# Patient Record
Sex: FEMALE | Race: OTHER | Employment: OTHER | URBAN - METROPOLITAN AREA
[De-identification: names, ages, dates, MRNs, and addresses within clinical notes are randomized per-mention and may not be internally consistent; named-entity substitution may affect disease eponyms.]

---

## 2019-09-30 ENCOUNTER — HOSPITAL ENCOUNTER (INPATIENT)
Facility: HOSPITAL | Age: 69
LOS: 5 days | Discharge: HOME | DRG: 417 | End: 2019-10-05
Attending: EMERGENCY MEDICINE | Admitting: HOSPITALIST
Payer: MEDICAID

## 2019-09-30 ENCOUNTER — APPOINTMENT (OUTPATIENT)
Dept: ULTRASOUND IMAGING | Facility: HOSPITAL | Age: 69
DRG: 417 | End: 2019-09-30
Attending: EMERGENCY MEDICINE
Payer: MEDICAID

## 2019-09-30 ENCOUNTER — APPOINTMENT (OUTPATIENT)
Dept: CT IMAGING | Facility: HOSPITAL | Age: 69
DRG: 417 | End: 2019-09-30
Attending: EMERGENCY MEDICINE
Payer: MEDICAID

## 2019-09-30 ENCOUNTER — APPOINTMENT (OUTPATIENT)
Dept: MRI IMAGING | Facility: HOSPITAL | Age: 69
DRG: 417 | End: 2019-09-30
Attending: SURGERY
Payer: MEDICAID

## 2019-09-30 DIAGNOSIS — K85.10 ACUTE BILIARY PANCREATITIS, UNSPECIFIED COMPLICATION STATUS: Primary | ICD-10-CM

## 2019-09-30 PROCEDURE — 99223 1ST HOSP IP/OBS HIGH 75: CPT | Performed by: HOSPITALIST

## 2019-09-30 PROCEDURE — 76376 3D RENDER W/INTRP POSTPROCES: CPT | Performed by: SURGERY

## 2019-09-30 PROCEDURE — 76705 ECHO EXAM OF ABDOMEN: CPT | Performed by: EMERGENCY MEDICINE

## 2019-09-30 PROCEDURE — 74181 MRI ABDOMEN W/O CONTRAST: CPT | Performed by: SURGERY

## 2019-09-30 RX ORDER — SODIUM CHLORIDE 0.9 % (FLUSH) 0.9 %
3 SYRINGE (ML) INJECTION AS NEEDED
Status: DISCONTINUED | OUTPATIENT
Start: 2019-09-30 | End: 2019-10-05

## 2019-09-30 RX ORDER — SODIUM CHLORIDE, SODIUM LACTATE, POTASSIUM CHLORIDE, CALCIUM CHLORIDE 600; 310; 30; 20 MG/100ML; MG/100ML; MG/100ML; MG/100ML
INJECTION, SOLUTION INTRAVENOUS ONCE
Status: DISCONTINUED | OUTPATIENT
Start: 2019-09-30 | End: 2019-09-30

## 2019-09-30 RX ORDER — LOSARTAN POTASSIUM AND HYDROCHLOROTHIAZIDE 12.5; 5 MG/1; MG/1
1 TABLET ORAL DAILY
COMMUNITY

## 2019-09-30 RX ORDER — MORPHINE SULFATE 2 MG/ML
2 INJECTION, SOLUTION INTRAMUSCULAR; INTRAVENOUS EVERY 2 HOUR PRN
Status: DISCONTINUED | OUTPATIENT
Start: 2019-09-30 | End: 2019-10-05

## 2019-09-30 RX ORDER — ACETAMINOPHEN 650 MG/1
650 SUPPOSITORY RECTAL EVERY 6 HOURS PRN
Status: DISCONTINUED | OUTPATIENT
Start: 2019-09-30 | End: 2019-10-03

## 2019-09-30 RX ORDER — ONDANSETRON 2 MG/ML
4 INJECTION INTRAMUSCULAR; INTRAVENOUS EVERY 6 HOURS PRN
Status: DISCONTINUED | OUTPATIENT
Start: 2019-09-30 | End: 2019-10-05

## 2019-09-30 RX ORDER — MORPHINE SULFATE 2 MG/ML
1 INJECTION, SOLUTION INTRAMUSCULAR; INTRAVENOUS EVERY 2 HOUR PRN
Status: DISCONTINUED | OUTPATIENT
Start: 2019-09-30 | End: 2019-10-05

## 2019-09-30 RX ORDER — DEXTROSE MONOHYDRATE 25 G/50ML
50 INJECTION, SOLUTION INTRAVENOUS AS NEEDED
Status: DISCONTINUED | OUTPATIENT
Start: 2019-09-30 | End: 2019-10-05

## 2019-09-30 RX ORDER — DEXTROSE, SODIUM CHLORIDE, AND POTASSIUM CHLORIDE 5; .45; .15 G/100ML; G/100ML; G/100ML
INJECTION INTRAVENOUS CONTINUOUS
Status: DISCONTINUED | OUTPATIENT
Start: 2019-09-30 | End: 2019-10-01

## 2019-09-30 RX ORDER — MORPHINE SULFATE 4 MG/ML
4 INJECTION, SOLUTION INTRAMUSCULAR; INTRAVENOUS EVERY 2 HOUR PRN
Status: DISCONTINUED | OUTPATIENT
Start: 2019-09-30 | End: 2019-10-05

## 2019-09-30 RX ORDER — AMLODIPINE BESYLATE 5 MG/1
5 TABLET ORAL DAILY
COMMUNITY

## 2019-09-30 NOTE — H&P
[facility]    PATIENT'S NAME: Eve Anisha   ATTENDING PHYSICIAN: Jackie Abdul MD   PATIENT ACCOUNT#:   [id number]    LOCATION:  [place] [address]  MEDICAL RECORD #:   [id number]       YOB: 1950  ADMISSION DATE: Atraumatic. Oropharynx clear. Dry mucous membranes. Normal hard and soft palate. Eyes:  Anicteric sclerae. Pupils equal, round, reactive to light and accommodation. NECK:  Supple. No lymphadenopathy. Trachea midline.    LUNGS:  Clear to auscultatio

## 2019-09-30 NOTE — ED PROVIDER NOTES
Patient Seen in: [facility] 5sw/se      History   Patient presents with:  Abdomen/Flank Pain (GI/)    Stated Complaint: abd pain    HPI    63-year-old female with history of diabetes and hypertension presents for evaluation of abdominal pain.   P sounds are normal. She exhibits no distension. There is tenderness in the right upper quadrant and epigastric area. There is no rebound and no guarding. Musculoskeletal: Normal range of motion.    Neurological: She is alert and oriented to person, place, Please view results for these tests on the individual orders.    LACTIC ACID 3 HR POST POSITIVE   HEMOGLOBIN A1C   RAINBOW DRAW BLUE   RAINBOW DRAW LAVENDER   RAINBOW DRAW LIGHT GREEN   RAINBOW DRAW GOLD   BLOOD CULTURE   BLOOD CULTURE          Imagin 3.375 g in dextrose 5 % 100 mL ADD-vantage (has no administration in time range)   Normal Saline Flush 0.9 % injection 3 mL (has no administration in time range)   dextrose 5 % and 0.45 % NaCl with KCl 20 mEq infusion (has no administration in time range) discharge summaries, testing, and procedures and reviewed those reports. Complicating Factors: The patient already has does not have any pertinent problems on file. to contribute to the complexity of this ED evaluation.     Oxygen Saturation: 94% on figueroa

## 2019-09-30 NOTE — CONSULTS
[facility]    Report of Consultation    Jody Castro Patient Status:  Inpatient    1950 MRN [id number]   Location [facility] 5SW/SE Attending Melissa Ramos MD   Hosp Day # 0 PCP None Pcp     Date of Admission:   5 % and 0.45 % NaCl with KCl 20 mEq infusion  Intravenous Continuous   morphINE sulfate (PF) 2 MG/ML injection 1 mg 1 mg Intravenous Q2H PRN   Or      morphINE sulfate (PF) 2 MG/ML injection 2 mg 2 mg Intravenous Q2H PRN   Or      morphINE sulfate (PF) 4 M sounds normal; no masses,  no organomegaly  Extremities: extremities normal, atraumatic, no cyanosis or edema  Neurologic: Grossly normal    Results:     Laboratory Data:  Lab Results   Component Value Date    WBC 14.9 (H) 09/30/2019    HGB 15.0 09/30/2019 IVFs and MRCP ordered given suspected gallstone pancreatitis. General Surgery also consulted and MRCP recommended.     Plan:  - Agree with MRCP as above  - Continue on IVF hydration  - CBC and CMP in AM  - Okay for diet from GI standpoint however please ma

## 2019-09-30 NOTE — CONSULTS
[facility]    Report of Consultation    Blake Castro Patient Status:  Inpatient    1950 MRN [id number]   Location [facility] 5SW/SE Attending Blair Escamilla MD   Hosp Day # 0 PCP None Pcp     Date of Admission: Oral, Q15 Min PRN  •  glucose (DEX4) oral liquid 15 g, 15 g, Oral, Q15 Min PRN  •  Insulin Regular Human (NOVOLIN R) 100 UNIT/ML injection 1-11 Units, 1-11 Units, Subcutaneous, 4 times per day    Review of Systems:    HEENT:  sinus pain or sore throat  RES 169 (H) 09/30/2019    BILT 1.8 09/30/2019    TP 7.5 09/30/2019     (H) 09/30/2019     (H) 09/30/2019    LIP 5,952 (H) 09/30/2019       Us Gallbladder (cpt=76705)    Result Date: 9/30/2019  CONCLUSION:  Gallbladder wall thickening with choleli

## 2019-09-30 NOTE — PLAN OF CARE
Problem: Patient Centered Care  Goal: Patient preferences are identified and integrated in the patient's plan of care  Description  Interventions:  - What would you like us to know as we care for you? I live at home with my son.  I have 8 children  - Prov

## 2019-09-30 NOTE — ED NOTES
Orders for admission, patient is aware of plan and ready to go upstairs. Any questions, please call ED ONEAL Parks at extension [phone]. Patient will need 3 hr lactic draw at 1500. Chinese speaking only.

## 2019-10-01 PROCEDURE — 99233 SBSQ HOSP IP/OBS HIGH 50: CPT | Performed by: HOSPITALIST

## 2019-10-01 RX ORDER — SODIUM CHLORIDE 9 MG/ML
INJECTION, SOLUTION INTRAVENOUS CONTINUOUS
Status: DISCONTINUED | OUTPATIENT
Start: 2019-10-01 | End: 2019-10-03

## 2019-10-01 RX ORDER — AMLODIPINE BESYLATE 5 MG/1
5 TABLET ORAL DAILY
Status: DISCONTINUED | OUTPATIENT
Start: 2019-10-01 | End: 2019-10-02

## 2019-10-01 RX ORDER — HYDRALAZINE HYDROCHLORIDE 20 MG/ML
20 INJECTION INTRAMUSCULAR; INTRAVENOUS EVERY 4 HOURS PRN
Status: DISCONTINUED | OUTPATIENT
Start: 2019-10-01 | End: 2019-10-05

## 2019-10-01 RX ORDER — LOSARTAN POTASSIUM 50 MG/1
50 TABLET ORAL DAILY
Status: DISCONTINUED | OUTPATIENT
Start: 2019-10-01 | End: 2019-10-05

## 2019-10-01 RX ORDER — HEPARIN SODIUM 5000 [USP'U]/ML
5000 INJECTION, SOLUTION INTRAVENOUS; SUBCUTANEOUS EVERY 8 HOURS SCHEDULED
Status: DISCONTINUED | OUTPATIENT
Start: 2019-10-01 | End: 2019-10-02

## 2019-10-01 NOTE — PLAN OF CARE
Problem: Patient Centered Care  Goal: Patient preferences are identified and integrated in the patient's plan of care  Description  Interventions:  - What would you like us to know as we care for you? I live at home with my son.  I have 8 children  - Prov pain and request assistance  - Assess pain using appropriate pain scale  - Administer analgesics based on type and severity of pain and evaluate response  - Implement non-pharmacological measures as appropriate and evaluate response  - Consider cultural an

## 2019-10-01 NOTE — PROGRESS NOTES
[facility]    Progress Note    Davion Antolin Matthew Patient Status:  Inpatient    1950 MRN [id number]   Location [facility] 5SW/SE Attending Anahy Joseph MD   Hosp Day # 1 PCP None Pcp       Subjective:   Harish Botello pericholecystic fluid. Sonographic Darrius Splinter sign was not elicited with this exam which could be due to recent administration of pain medication however correlate with clinical history. Common bile duct is also enlarged at 11 mm.   The findings suggest acute

## 2019-10-01 NOTE — PROGRESS NOTES
[place] [facility]    Progress Note    March Diego Castro Patient Status:  Inpatient    1950 MRN [id number]   Location [facility] 5SW/SE Attending Abdullahi Little MD   Hosp Day # 1 PCP None Pcp     Subjective:     Tmax 100.9 yest Prophy: SCDs     Leonarda Alvarez PA-C  10/1/2019  3:49 PM    Will d/w Dr. Symone Woodruff  D/w RN    Meds:     • metoprolol Tartrate  25 mg Oral 2x Daily(Beta Blocker)   • amLODIPine Besylate  5 mg Oral Daily   • Losartan Potassium  50 mg Oral Daily   • piperac Nonspecific mild peripancreatic fat stranding could reflect acute interstitial edematous pancreatitis. No acute peripancreatic fluid collection is appreciated. 4. Hepatomegaly with underlying hepatic steatosis.   5. Nonspecific gastric wall thickening like

## 2019-10-01 NOTE — CM/SW NOTE
BEAU left a voicemail for [employer] [id number], regarding medicaid screening. BEAU/CM to remain available for support and/or discharge planning.      Rayo Mason

## 2019-10-01 NOTE — PROGRESS NOTES
[place] [facility]    Progress Note    March Diego Castro Patient Status:  Inpatient    1950 MRN [id number]   Location [facility] 5SW/SE Attending Abdullahi Little MD   Hosp Day # 1 PCP None Pcp     Subjective:     Tmax 100.9 yest Prophy: Jacek Conti PA-C  10/1/2019  3:49 PM    Will d/w Dr. Esau Chauhan  D/w RN  Patient examined myself as well as with PA. Patient with slowly resolving gallstone pancreatitis. Agree begin sips of clear liquids.   Will wait for lipase and 10/1/2019  5:05 PM        Meds:     • metoprolol Tartrate  25 mg Oral 2x Daily(Beta Blocker)   • amLODIPine Besylate  5 mg Oral Daily   • Losartan Potassium  50 mg Oral Daily   • piperacillin-tazobactam  3.375 g Intravenous Q8H   • Insulin Regular Tatyana interstitial edematous pancreatitis. No acute peripancreatic fluid collection is appreciated. 4. Hepatomegaly with underlying hepatic steatosis.   5. Nonspecific gastric wall thickening likely reflects underdistention, but correlation for gastritis is advi

## 2019-10-01 NOTE — PROGRESS NOTES
[facility]    Progress Note    Hannah Farooq Matthew Patient Status:  Inpatient    1950 MRN [id number]   Location [facility] 5SW/SE Attending Giana Davenport MD   Hosp Day # 1 PCP None Pcp        Subjective:   Patient having 11:45     Approved by (CST): Ally Johnson MD on 9/30/2019 at 11:48          Mri Mrcp W/3d Only (GHX=[id number])    Result Date: 10/1/2019  CONCLUSION:  1. Cholelithiasis with MR findings concerning for acute cholecystitis.   2. There is mild extrahepatic b stranding which is likely patients presenting symptoms consistent with pancreatitis. - Suspect gallstone pancreatitis and possibly acute cholecystitis in setting of gallstones which have resolved. Plan:  - Continue on IVF hydration  - Daily CMP.   No n

## 2019-10-02 ENCOUNTER — APPOINTMENT (OUTPATIENT)
Dept: INTERVENTIONAL RADIOLOGY/VASCULAR | Facility: HOSPITAL | Age: 69
DRG: 417 | End: 2019-10-02
Attending: INTERNAL MEDICINE
Payer: MEDICAID

## 2019-10-02 ENCOUNTER — APPOINTMENT (OUTPATIENT)
Dept: CV DIAGNOSTICS | Facility: HOSPITAL | Age: 69
DRG: 417 | End: 2019-10-02
Attending: HOSPITALIST
Payer: MEDICAID

## 2019-10-02 PROCEDURE — 93306 TTE W/DOPPLER COMPLETE: CPT | Performed by: HOSPITALIST

## 2019-10-02 PROCEDURE — 4A023N7 MEASUREMENT OF CARDIAC SAMPLING AND PRESSURE, LEFT HEART, PERCUTANEOUS APPROACH: ICD-10-PCS | Performed by: INTERNAL MEDICINE

## 2019-10-02 PROCEDURE — B2111ZZ FLUOROSCOPY OF MULTIPLE CORONARY ARTERIES USING LOW OSMOLAR CONTRAST: ICD-10-PCS | Performed by: INTERNAL MEDICINE

## 2019-10-02 PROCEDURE — 99233 SBSQ HOSP IP/OBS HIGH 50: CPT | Performed by: HOSPITALIST

## 2019-10-02 RX ORDER — NITROGLYCERIN 20 MG/100ML
INJECTION INTRAVENOUS
Status: COMPLETED
Start: 2019-10-02 | End: 2019-10-02

## 2019-10-02 RX ORDER — ENOXAPARIN SODIUM 100 MG/ML
1 INJECTION SUBCUTANEOUS EVERY 12 HOURS SCHEDULED
Status: DISCONTINUED | OUTPATIENT
Start: 2019-10-02 | End: 2019-10-02

## 2019-10-02 RX ORDER — ACETAMINOPHEN 325 MG/1
650 TABLET ORAL EVERY 6 HOURS PRN
Status: DISCONTINUED | OUTPATIENT
Start: 2019-10-02 | End: 2019-10-03

## 2019-10-02 RX ORDER — MIDAZOLAM HYDROCHLORIDE 1 MG/ML
INJECTION INTRAMUSCULAR; INTRAVENOUS
Status: COMPLETED
Start: 2019-10-02 | End: 2019-10-02

## 2019-10-02 RX ORDER — HEPARIN SODIUM AND DEXTROSE 10000; 5 [USP'U]/100ML; G/100ML
12 INJECTION INTRAVENOUS ONCE
Status: COMPLETED | OUTPATIENT
Start: 2019-10-02 | End: 2019-10-02

## 2019-10-02 RX ORDER — HEPARIN SODIUM AND DEXTROSE 10000; 5 [USP'U]/100ML; G/100ML
INJECTION INTRAVENOUS CONTINUOUS
Status: DISCONTINUED | OUTPATIENT
Start: 2019-10-02 | End: 2019-10-03

## 2019-10-02 RX ORDER — HEPARIN SODIUM 1000 [USP'U]/ML
60 INJECTION, SOLUTION INTRAVENOUS; SUBCUTANEOUS ONCE
Status: COMPLETED | OUTPATIENT
Start: 2019-10-02 | End: 2019-10-02

## 2019-10-02 RX ORDER — DILTIAZEM HYDROCHLORIDE 5 MG/ML
INJECTION INTRAVENOUS
Status: DISPENSED
Start: 2019-10-02 | End: 2019-10-02

## 2019-10-02 RX ORDER — METOPROLOL TARTRATE 50 MG/1
50 TABLET, FILM COATED ORAL
Status: DISCONTINUED | OUTPATIENT
Start: 2019-10-02 | End: 2019-10-05

## 2019-10-02 RX ORDER — HEPARIN SODIUM 1000 [USP'U]/ML
INJECTION, SOLUTION INTRAVENOUS; SUBCUTANEOUS
Status: COMPLETED
Start: 2019-10-02 | End: 2019-10-02

## 2019-10-02 RX ORDER — SODIUM CHLORIDE 9 MG/ML
INJECTION, SOLUTION INTRAVENOUS
Status: CANCELLED | OUTPATIENT
Start: 2019-10-03 | End: 2019-10-03

## 2019-10-02 RX ORDER — ASPIRIN 81 MG/1
TABLET, CHEWABLE ORAL
Status: COMPLETED
Start: 2019-10-02 | End: 2019-10-02

## 2019-10-02 RX ORDER — VERAPAMIL HYDROCHLORIDE 2.5 MG/ML
INJECTION, SOLUTION INTRAVENOUS
Status: COMPLETED
Start: 2019-10-02 | End: 2019-10-02

## 2019-10-02 RX ORDER — ATORVASTATIN CALCIUM 40 MG/1
80 TABLET, FILM COATED ORAL NIGHTLY
Status: DISCONTINUED | OUTPATIENT
Start: 2019-10-02 | End: 2019-10-05

## 2019-10-02 RX ORDER — DILTIAZEM HYDROCHLORIDE 5 MG/ML
5 INJECTION INTRAVENOUS ONCE
Status: COMPLETED | OUTPATIENT
Start: 2019-10-02 | End: 2019-10-02

## 2019-10-02 RX ORDER — LIDOCAINE HYDROCHLORIDE 20 MG/ML
INJECTION, SOLUTION EPIDURAL; INFILTRATION; INTRACAUDAL; PERINEURAL
Status: COMPLETED
Start: 2019-10-02 | End: 2019-10-02

## 2019-10-02 RX ORDER — CHLORHEXIDINE GLUCONATE 4 G/100ML
30 SOLUTION TOPICAL
Status: CANCELLED | OUTPATIENT
Start: 2019-10-03 | End: 2019-10-03

## 2019-10-02 RX ORDER — POTASSIUM CHLORIDE 20 MEQ/1
40 TABLET, EXTENDED RELEASE ORAL EVERY 4 HOURS
Status: COMPLETED | OUTPATIENT
Start: 2019-10-02 | End: 2019-10-02

## 2019-10-02 RX ORDER — ASPIRIN 81 MG/1
81 TABLET, CHEWABLE ORAL DAILY
Status: DISCONTINUED | OUTPATIENT
Start: 2019-10-02 | End: 2019-10-05

## 2019-10-02 NOTE — PROGRESS NOTES
Inpatient Throughput Communication:     Called inpatient RN  and notified of scheduled procedure [id number]     Verified that appropriate consent is signed: cathi said she would  Appropriate Consent Signed: she will  Access Site Hair Clipped and skin prepp

## 2019-10-02 NOTE — PROGRESS NOTES
Called to bedside after rapid response. Upon arrival to room, patient admits to some left-sided nonradiating chest discomfort. States chest discomfort appears to be gradually improving. Denies significant associated dyspnea. Saturation stable.   Van Cardenas

## 2019-10-02 NOTE — PLAN OF CARE
Patient complained to PCT that she is having palpitations at [address], this writer did assessment and VS, HR was at 140s-160s, complained of chest pain used language line. Rapid Response Team was activated.  Dr. Miriam Barriga ordered for STAT EKG and STAT troponin, o

## 2019-10-02 NOTE — IVS NOTE
Procedure hand off report given to [facility]. Pt's vital signs are stable. Procedural access site is dry and intact with no signs and symptoms of bleeding and hematoma.    TR Band in R wrist with 7 ml of air  R groin with dressing, dry and intact, no bl

## 2019-10-02 NOTE — PROGRESS NOTES
[facility]    Progress Note    Janett Ginaedwina Castro Patient Status:  Inpatient    1950 MRN [id number]   Location [facility] 5SW/SE Attending Evelyn Redding MD   Hosp Day # 2 PCP None Pcp       Subjective:   Zulay Gallardo 10/02/2019       Us Gallbladder (cpt=76705)    Result Date: 9/30/2019  CONCLUSION:  Gallbladder wall thickening with cholelithiasis and pericholecystic fluid.   Sonographic Section Yuri sign was not elicited with this exam which could be due to recent administrat cholecystitis  -plan was for surgery but given episode of afib will get cardiology clearance first so holding off on surgery for now  -npo initially - now CLD  -cont IV abx    Afib RVR  -given IV cardizem, back to NSR now  -cardiology consult for cardiac c

## 2019-10-02 NOTE — OPERATIVE REPORT
Preop diagnosis: nstemi  Post op diagnosis: cad  Procedures: [facility] cors  Findings: 50-60% small OM1 stenosis otherwise ANGELITO 3 flow in all vessels. This is consistent with demand MI with RVR. Rec: Continued beta blocker, statin, asa.  May proceed with cholecy

## 2019-10-02 NOTE — PROGRESS NOTES
[place] [facility]    Progress Note    Hira Castro Patient Status:  Inpatient    1950 MRN [id number]   Location [facility] 3W/SW Attending Debbie Patino MD   [facility] Day # 2 PCP None Pcp       Subjective:   Tod Florian .0 175.0 192.0     No results found for: PT, INR    Recent Labs   Lab 09/30/19  1011 10/01/19  0645 10/02/19  0533   * 277* 197*   BUN 12 6* 8   CREATSERUM 0.78 0.58 0.59   GFRAA 91 110 110   GFRNAA 79 96 95   CA 9.4 8.8 8.9   ALB 3.4 2.7* service. There are no major discrepancies.    Dictated by (CST): Cortes Peter MD on 10/01/2019 at 10:04     Approved by (CST): Cortes Peter MD on 10/01/2019 at 10:13          Ekg 12-lead    Result Date: 10/2/2019  ECG Report  Interpretation  ---------

## 2019-10-02 NOTE — PLAN OF CARE
Problem: Patient Centered Care  Goal: Patient preferences are identified and integrated in the patient's plan of care  Description  Interventions:  - What would you like us to know as we care for you? I live at home with my son.  I have 8 children  - Prov pain and request assistance  - Assess pain using appropriate pain scale  - Administer analgesics based on type and severity of pain and evaluate response  - Implement non-pharmacological measures as appropriate and evaluate response  - Consider cultural an gtt initiated. Surgery on consult. Anticipating lap sera Thursday after cardiac clearance. Vitals stable. Remains afebrile. Iv abx. Denies pain Language line in room for interpretation. Accuchecks q6, insulin coverage.  Encourage patient to use call light

## 2019-10-02 NOTE — CONSULTS
[facility]    PATIENT'S NAME: Jose Genaro   ATTENDING PHYSICIAN: Lisa Pulido MD   CONSULTING PHYSICIAN: Nestor Majano DO   PATIENT ACCOUNT#:   [de-identified]    LOCATION:  [address] [place] [address] #:   [id number]       DATE OF TARYN pancreatitis with acute cholecystitis. The patient was febrile to 101 degrees last night. 4.   Hypertension and diabetes. RECOMMENDATIONS:  Discussed with Surgery service. Hold off on surgery at this time.   We will increase the metoprolol to 50 mg tw

## 2019-10-02 NOTE — PLAN OF CARE
Problem: Patient Centered Care  Goal: Patient preferences are identified and integrated in the patient's plan of care  Description  Interventions:  - What would you like us to know as we care for you? I live at home with my son.  I have 8 children  - Prov pain and request assistance  - Assess pain using appropriate pain scale  - Administer analgesics based on type and severity of pain and evaluate response  - Implement non-pharmacological measures as appropriate and evaluate response  - Consider cultural an tele, cardizem gtt started and patient converted back to SR. Troponin negative. Fever spiked, tylenol given, IV bolus given. cardizem gtt stopped. 2echo ordered and Dr Pio Vega on consult.      Damir Yousif at bedside this AM, updated with plan of care, son ab

## 2019-10-02 NOTE — RESPIRATORY THERAPY NOTE
RESPIRATORY THERAPY RAPID RESPONSE NOTE    RRT called for respiratory distress? no  Breathing pattern: Normal  Patient currently being seen by Respiratory Care? no RT interventions necessary? yes  Oxygen applied/increased? yes  Nebulizer performed?  no  ABG

## 2019-10-03 ENCOUNTER — ANESTHESIA EVENT (OUTPATIENT)
Dept: SURGERY | Facility: HOSPITAL | Age: 69
DRG: 417 | End: 2019-10-03
Payer: MEDICAID

## 2019-10-03 ENCOUNTER — ANESTHESIA (OUTPATIENT)
Dept: SURGERY | Facility: HOSPITAL | Age: 69
DRG: 417 | End: 2019-10-03
Payer: MEDICAID

## 2019-10-03 ENCOUNTER — APPOINTMENT (OUTPATIENT)
Dept: GENERAL RADIOLOGY | Facility: HOSPITAL | Age: 69
DRG: 417 | End: 2019-10-03
Attending: SURGERY
Payer: MEDICAID

## 2019-10-03 PROCEDURE — 74300 X-RAY BILE DUCTS/PANCREAS: CPT | Performed by: SURGERY

## 2019-10-03 PROCEDURE — 99233 SBSQ HOSP IP/OBS HIGH 50: CPT | Performed by: HOSPITALIST

## 2019-10-03 PROCEDURE — BF131ZZ FLUOROSCOPY OF GALLBLADDER AND BILE DUCTS USING LOW OSMOLAR CONTRAST: ICD-10-PCS | Performed by: SURGERY

## 2019-10-03 PROCEDURE — 0DNW4ZZ RELEASE PERITONEUM, PERCUTANEOUS ENDOSCOPIC APPROACH: ICD-10-PCS | Performed by: SURGERY

## 2019-10-03 PROCEDURE — 0FT44ZZ RESECTION OF GALLBLADDER, PERCUTANEOUS ENDOSCOPIC APPROACH: ICD-10-PCS | Performed by: SURGERY

## 2019-10-03 RX ORDER — DEXTROSE MONOHYDRATE 25 G/50ML
50 INJECTION, SOLUTION INTRAVENOUS
Status: DISCONTINUED | OUTPATIENT
Start: 2019-10-03 | End: 2019-10-03 | Stop reason: HOSPADM

## 2019-10-03 RX ORDER — ACETAMINOPHEN 500 MG
1000 TABLET ORAL EVERY 8 HOURS
Status: DISCONTINUED | OUTPATIENT
Start: 2019-10-03 | End: 2019-10-05

## 2019-10-03 RX ORDER — ONDANSETRON 2 MG/ML
INJECTION INTRAMUSCULAR; INTRAVENOUS AS NEEDED
Status: DISCONTINUED | OUTPATIENT
Start: 2019-10-03 | End: 2019-10-03 | Stop reason: SURG

## 2019-10-03 RX ORDER — MORPHINE SULFATE 4 MG/ML
4 INJECTION, SOLUTION INTRAMUSCULAR; INTRAVENOUS EVERY 10 MIN PRN
Status: DISCONTINUED | OUTPATIENT
Start: 2019-10-03 | End: 2019-10-03 | Stop reason: HOSPADM

## 2019-10-03 RX ORDER — ACETAMINOPHEN 10 MG/ML
1000 INJECTION, SOLUTION INTRAVENOUS ONCE
Status: COMPLETED | OUTPATIENT
Start: 2019-10-03 | End: 2019-10-03

## 2019-10-03 RX ORDER — NALOXONE HYDROCHLORIDE 0.4 MG/ML
80 INJECTION, SOLUTION INTRAMUSCULAR; INTRAVENOUS; SUBCUTANEOUS AS NEEDED
Status: DISCONTINUED | OUTPATIENT
Start: 2019-10-03 | End: 2019-10-03 | Stop reason: HOSPADM

## 2019-10-03 RX ORDER — BUPIVACAINE HYDROCHLORIDE AND EPINEPHRINE 2.5; 5 MG/ML; UG/ML
INJECTION, SOLUTION INFILTRATION; PERINEURAL AS NEEDED
Status: DISCONTINUED | OUTPATIENT
Start: 2019-10-03 | End: 2019-10-03 | Stop reason: HOSPADM

## 2019-10-03 RX ORDER — NEOSTIGMINE METHYLSULFATE 0.5 MG/ML
INJECTION INTRAVENOUS AS NEEDED
Status: DISCONTINUED | OUTPATIENT
Start: 2019-10-03 | End: 2019-10-03 | Stop reason: SURG

## 2019-10-03 RX ORDER — SODIUM CHLORIDE, SODIUM LACTATE, POTASSIUM CHLORIDE, CALCIUM CHLORIDE 600; 310; 30; 20 MG/100ML; MG/100ML; MG/100ML; MG/100ML
INJECTION, SOLUTION INTRAVENOUS CONTINUOUS
Status: DISCONTINUED | OUTPATIENT
Start: 2019-10-03 | End: 2019-10-03 | Stop reason: HOSPADM

## 2019-10-03 RX ORDER — PROCHLORPERAZINE EDISYLATE 5 MG/ML
5 INJECTION INTRAMUSCULAR; INTRAVENOUS ONCE AS NEEDED
Status: DISCONTINUED | OUTPATIENT
Start: 2019-10-03 | End: 2019-10-03 | Stop reason: HOSPADM

## 2019-10-03 RX ORDER — INSULIN ASPART 100 [IU]/ML
4 INJECTION, SOLUTION INTRAVENOUS; SUBCUTANEOUS ONCE
Status: COMPLETED | OUTPATIENT
Start: 2019-10-03 | End: 2019-10-03

## 2019-10-03 RX ORDER — ONDANSETRON 2 MG/ML
4 INJECTION INTRAMUSCULAR; INTRAVENOUS ONCE AS NEEDED
Status: DISCONTINUED | OUTPATIENT
Start: 2019-10-03 | End: 2019-10-03 | Stop reason: HOSPADM

## 2019-10-03 RX ORDER — MORPHINE SULFATE 10 MG/ML
6 INJECTION, SOLUTION INTRAMUSCULAR; INTRAVENOUS EVERY 10 MIN PRN
Status: DISCONTINUED | OUTPATIENT
Start: 2019-10-03 | End: 2019-10-03 | Stop reason: HOSPADM

## 2019-10-03 RX ORDER — HEPARIN SODIUM 5000 [USP'U]/ML
5000 INJECTION, SOLUTION INTRAVENOUS; SUBCUTANEOUS EVERY 12 HOURS
Status: DISCONTINUED | OUTPATIENT
Start: 2019-10-04 | End: 2019-10-05

## 2019-10-03 RX ORDER — MORPHINE SULFATE 2 MG/ML
2 INJECTION, SOLUTION INTRAMUSCULAR; INTRAVENOUS EVERY 10 MIN PRN
Status: DISCONTINUED | OUTPATIENT
Start: 2019-10-03 | End: 2019-10-03 | Stop reason: HOSPADM

## 2019-10-03 RX ORDER — GLYCOPYRROLATE 0.2 MG/ML
INJECTION INTRAMUSCULAR; INTRAVENOUS AS NEEDED
Status: DISCONTINUED | OUTPATIENT
Start: 2019-10-03 | End: 2019-10-03 | Stop reason: SURG

## 2019-10-03 RX ORDER — DEXTROSE, SODIUM CHLORIDE, AND POTASSIUM CHLORIDE 5; .45; .15 G/100ML; G/100ML; G/100ML
INJECTION INTRAVENOUS CONTINUOUS
Status: DISCONTINUED | OUTPATIENT
Start: 2019-10-03 | End: 2019-10-04

## 2019-10-03 RX ORDER — HYDROMORPHONE HYDROCHLORIDE 1 MG/ML
0.2 INJECTION, SOLUTION INTRAMUSCULAR; INTRAVENOUS; SUBCUTANEOUS EVERY 5 MIN PRN
Status: DISCONTINUED | OUTPATIENT
Start: 2019-10-03 | End: 2019-10-03 | Stop reason: HOSPADM

## 2019-10-03 RX ORDER — SODIUM CHLORIDE, SODIUM LACTATE, POTASSIUM CHLORIDE, CALCIUM CHLORIDE 600; 310; 30; 20 MG/100ML; MG/100ML; MG/100ML; MG/100ML
INJECTION, SOLUTION INTRAVENOUS CONTINUOUS PRN
Status: DISCONTINUED | OUTPATIENT
Start: 2019-10-03 | End: 2019-10-03 | Stop reason: SURG

## 2019-10-03 RX ORDER — TRAMADOL HYDROCHLORIDE 50 MG/1
50 TABLET ORAL EVERY 6 HOURS PRN
Qty: 20 TABLET | Refills: 0 | Status: SHIPPED | OUTPATIENT
Start: 2019-10-03

## 2019-10-03 RX ORDER — DOCUSATE SODIUM 100 MG/1
100 CAPSULE, LIQUID FILLED ORAL 2 TIMES DAILY
Qty: 14 CAPSULE | Refills: 0 | Status: SHIPPED | OUTPATIENT
Start: 2019-10-03 | End: 2019-10-10

## 2019-10-03 RX ORDER — HYDROMORPHONE HYDROCHLORIDE 1 MG/ML
0.6 INJECTION, SOLUTION INTRAMUSCULAR; INTRAVENOUS; SUBCUTANEOUS EVERY 5 MIN PRN
Status: DISCONTINUED | OUTPATIENT
Start: 2019-10-03 | End: 2019-10-03 | Stop reason: HOSPADM

## 2019-10-03 RX ORDER — HALOPERIDOL 5 MG/ML
0.25 INJECTION INTRAMUSCULAR ONCE AS NEEDED
Status: DISCONTINUED | OUTPATIENT
Start: 2019-10-03 | End: 2019-10-03 | Stop reason: HOSPADM

## 2019-10-03 RX ORDER — HYDROMORPHONE HYDROCHLORIDE 1 MG/ML
0.4 INJECTION, SOLUTION INTRAMUSCULAR; INTRAVENOUS; SUBCUTANEOUS EVERY 5 MIN PRN
Status: DISCONTINUED | OUTPATIENT
Start: 2019-10-03 | End: 2019-10-03 | Stop reason: HOSPADM

## 2019-10-03 RX ORDER — ROCURONIUM BROMIDE 10 MG/ML
INJECTION, SOLUTION INTRAVENOUS AS NEEDED
Status: DISCONTINUED | OUTPATIENT
Start: 2019-10-03 | End: 2019-10-03 | Stop reason: SURG

## 2019-10-03 RX ORDER — MIDAZOLAM HYDROCHLORIDE 1 MG/ML
INJECTION INTRAMUSCULAR; INTRAVENOUS AS NEEDED
Status: DISCONTINUED | OUTPATIENT
Start: 2019-10-03 | End: 2019-10-03 | Stop reason: SURG

## 2019-10-03 RX ORDER — FAMOTIDINE 10 MG/ML
20 INJECTION, SOLUTION INTRAVENOUS 2 TIMES DAILY
Status: DISCONTINUED | OUTPATIENT
Start: 2019-10-03 | End: 2019-10-05

## 2019-10-03 RX ADMIN — SODIUM CHLORIDE, SODIUM LACTATE, POTASSIUM CHLORIDE, CALCIUM CHLORIDE: 600; 310; 30; 20 INJECTION, SOLUTION INTRAVENOUS at 14:01:00

## 2019-10-03 RX ADMIN — ROCURONIUM BROMIDE 10 MG: 10 INJECTION, SOLUTION INTRAVENOUS at 15:29:00

## 2019-10-03 RX ADMIN — ROCURONIUM BROMIDE 10 MG: 10 INJECTION, SOLUTION INTRAVENOUS at 14:57:00

## 2019-10-03 RX ADMIN — ROCURONIUM BROMIDE 40 MG: 10 INJECTION, SOLUTION INTRAVENOUS at 14:36:00

## 2019-10-03 RX ADMIN — GLYCOPYRROLATE 0.6 MG: 0.2 INJECTION INTRAMUSCULAR; INTRAVENOUS at 16:06:00

## 2019-10-03 RX ADMIN — MIDAZOLAM HYDROCHLORIDE 2 MG: 1 INJECTION INTRAMUSCULAR; INTRAVENOUS at 14:28:00

## 2019-10-03 RX ADMIN — NEOSTIGMINE METHYLSULFATE 3 MG: 0.5 INJECTION INTRAVENOUS at 16:06:00

## 2019-10-03 RX ADMIN — ONDANSETRON 4 MG: 2 INJECTION INTRAMUSCULAR; INTRAVENOUS at 16:06:00

## 2019-10-03 RX ADMIN — ACETAMINOPHEN 1000 MG: 10 INJECTION, SOLUTION INTRAVENOUS at 16:10:00

## 2019-10-03 NOTE — ANESTHESIA PROCEDURE NOTES
Airway  Date/Time: 10/3/2019 2:40 PM  Urgency: elective    Airway not difficult    General Information and Staff    Patient location during procedure: OR  Anesthesiologist: Moises Coppola MD  Performed: anesthesiologist     Indications and Patient Condition

## 2019-10-03 NOTE — OR PREOP
Patient c/o of slight chest pain prior to be taken into surgery. OR RN, CRNA and Dr. Nadya Massey around nursing station and entered room immediately. Noa Aguirre RN in room translating.  Per patient the pain was gone quickly and not as severe as it was the last time

## 2019-10-03 NOTE — ANESTHESIA PROCEDURE NOTES
Peripheral IV  Date/Time: 10/3/2019 2:30 PM  Inserted by: Chen Blakely MD    Placement  Needle size: 18 G  Laterality: right  Location: wrist  Local anesthetic: none  Site prep: alcohol  Technique: anatomical landmarks  Attempts: 1

## 2019-10-03 NOTE — ANESTHESIA PREPROCEDURE EVALUATION
Anesthesia PreOp Note    HPI:     Mario Hernandez is a 81year old female who presents for preoperative consultation requested by: Muriel Olmos MD    Date of Surgery: 9/30/2019 - 10/3/2019    Procedure(s):  LAPAROSCOPIC CHOLECYSTECTOMY  Indica [MAR Hold] aspirin chewable tab 81 mg 81 mg Oral Daily Nestor Gonzalez DO 81 mg at 10/03/19 0917    heparin (PORCINE) drip [id number]/250mL infusion CONTINUOUS 200-3,000 Units/hr Intravenous Continuous Nestor Gonzalez DO Stopped at 10/02/19 1700    [facility] Epic:  traMADol HCl 50 MG Oral Tab Take 1 tablet (50 mg total) by mouth every 6 (six) hours as needed for Pain. No driving and no alcohol while taking this medication.  Disp: 20 tablet Rfl: 0   docusate sodium (COLACE) 100 MG Oral Cap Take 1 capsule (100 mg HGB 12.4 10/03/2019    HCT 35.8 10/03/2019    MCV 93.7 10/03/2019    MCH 32.5 10/03/2019    MCHC 34.6 10/03/2019    RDW 11.6 10/03/2019    .0 10/03/2019     Lab Results   Component Value Date     10/03/2019    K 4.3 10/03/2019     10/03/ abnormality  STAT ECG performed NSR WNL,D/W cardiologist : proceed with surgery  Informed Consent Plan and Risks Discussed With:  Patient and spouse  Discussed plan with:  CRNA, attending and surgeon  Provider Attestation (if preop done by other):   With RN

## 2019-10-03 NOTE — OPERATIVE REPORT
[facility] OPERATING ROOM OPERATIVE REPORT:     PATIENT NAME: Junaid Oneil  : 1950   MRN: [id number]  SITE:  [facility]:   10/3/2019     PREOPERATIVE DIAGNOSIS: Acute on Chronic Cholecystitis, With galls the operating room and placed in the supine position. General anesthetic was administered via endotracheal tube by anesthesia. . The patient's stomach was decompressed With an orogastric tube and the bladder was decompressed with Willard catheter.   The abdo hemoclips were placed around the cystic duct with care to avoid tenting the cystic duct common duct junction. In a similar fashion, the cystic artery was then clipped with 2 hemoclips proximally and one distally and the base of the gallbladder.   The cystic

## 2019-10-03 NOTE — ANESTHESIA POSTPROCEDURE EVALUATION
Patient: Matt Lr    Procedure Summary     Date:  10/03/19 Room / Location:  [facility] OR 05 / [facility] OR    Anesthesia Start:  2966 Anesthesia Stop:      Procedure:  LAPAROSCOPIC CHOLECYSTECTOMY (N/A ) Diagnosis:  (gallstone pancreatitis)

## 2019-10-03 NOTE — PROGRESS NOTES
Patient seen in follow up. Patient denies any chest pain or sob.      10/03/19  1015   BP: 126/52   Pulse: 56   Resp:    Temp:        Intake/Output Summary (Last 24 hours) at 10/3/2019 1056  Last data filed at 10/3/2019 0500  Gross per 24 hour   Intak glucose (DEX4) oral liquid 15 g 15 g Oral Q15 Min PRN   acetaminophen (TYLENOL) 650 MG rectal suppository 650 mg 650 mg Rectal Q6H PRN       Medications Prior to Admission:  amLODIPine Besylate 5 MG Oral Tab Take 5 mg by mouth daily.    metoprolol Tartrate

## 2019-10-03 NOTE — BRIEF OP NOTE
Pre-Operative Diagnosis: gallstone pancreatitis     Post-Operative Diagnosis: The same     Procedure Performed:   Procedure(s):  laparoscopic cholecystectomy with cholangiogam     Surgeon(s) and Role:     Zoya Estrada MD - Primary    Assistant(s):

## 2019-10-03 NOTE — PROGRESS NOTES
[place] [facility] - [facility]    Progress Note    Mary Castro Patient Status:  Inpatient    1950 MRN [id number]   Location [facility] 5SW/SE Attending Gabrielle Cruz, [address] [facility] Day # 3 PCP None Pcp       Subjective:   Silas Gonzales -------------------------- Atrial fibrillation -Diffuse ST depression - possibely rate related ABNORMAL No previous ECGs available Electronically signed on 10/02/2019 at 15:02 by Hernán Sands MD        Assessment and Plan:       Acute biliary pancreatitis/C

## 2019-10-04 PROCEDURE — 99233 SBSQ HOSP IP/OBS HIGH 50: CPT | Performed by: HOSPITALIST

## 2019-10-04 RX ORDER — TRAMADOL HYDROCHLORIDE 50 MG/1
50 TABLET ORAL EVERY 6 HOURS PRN
Status: DISCONTINUED | OUTPATIENT
Start: 2019-10-04 | End: 2019-10-05

## 2019-10-04 NOTE — PROGRESS NOTES
[place] [facility] - [facility]    Progress Note    Alice Castro Patient Status:  Inpatient    1950 MRN [id number]   Location [facility] 5SW/SE Attending Hilton Lockwood, [address] [facility] Day # 4 PCP None Pcp       Subjective:   Marcelle Horta 10/04/2019    PHOS 3.2 10/04/2019    TROP 1.510 (HH) 10/02/2019       Xr Oper Cholangiogram (cpt=74300)    Result Date: 10/3/2019  CONCLUSION:  Normal operative cholangiogram.    Dictated by (CST):  Bao Hall MD on 10/03/2019 at 16:14     Approved b

## 2019-10-04 NOTE — PROGRESS NOTES
[facility]    [facility] Note    Precilla Jacobo Castro Patient Status:  Inpatient    1950 MRN [id number]   Location [facility] 2W/SW Attending Ozzie Bourgeois MD   Hosp Day # 4 PCP None Pcp       Subjective:   Gracia Garcia 8. 5   ALB 2.6*  --  2.6* 2.6*    138 140 140   K 3.3* 3.5 4.3 3.9    110 112 110   CO2 21.0 22.0 22.0 24.0   ALKPHO 97  --  94 94   AST 24  --  19 53*   ALT 61*  --  46 68*   BILT 1.2  --  1.5 0.7   TP 6.6  --  6.4 6.4   ABEL 46  --   --   --

## 2019-10-04 NOTE — DIABETES ED
Patient educated regarding diabetes diet basics. Discussed carbohydrate foods, portion sizes, and food label reading. Handout given and initial meal plan provided. Encouraged to attend outpatient diabetes education. Room to improve on diet.      Eliezer Arnold

## 2019-10-04 NOTE — PLAN OF CARE
Pt transferred to floor from CCU post cardiac cath 10/2/19 (femoral approach) and lap sera on 10/3/19. Mild temp elevation, will continue to monitor, all other VSS. Not reporting any pain. Pt tolerating diet. Family at bedside translating.    Problem: Julieta Garcia ordered  - Monitor for and notify MD/LIP of changes as needed  Outcome: Progressing     Problem: PAIN - ADULT  Goal: Verbalizes/displays adequate comfort level or patient's stated pain goal  Description  INTERVENTIONS:  - Encourage pt to monitor pain and r measures for life threatening arrhythmias  - Monitor electrolytes and administer replacement therapy as ordered  Outcome: Progressing

## 2019-10-04 NOTE — PROGRESS NOTES
Below note from [facility]. Patient was interviewed and examined. Agree with assessment and plan with edits to the document performed as necessary.     [facility]  Progress Note    Kieran Castro Patient Status:  Inpatient    1950 MRN EKG 10/3/19  Sinus Rhythm  WITHIN NORMAL LIMITS    Echo 10/2/19  Study Conclusions  1. Left ventricle: The cavity size was normal. Wall thickness was     increased in a pattern of mild LVH.  Systolic function was     normal. The estimated ejection fract (PF) 2 MG/ML injection 2 mg 2 mg Intravenous Q2H PRN   Or      morphINE sulfate (PF) 4 MG/ML injection 4 mg 4 mg Intravenous Q2H PRN   ondansetron HCl (ZOFRAN) injection 4 mg 4 mg Intravenous Q6H PRN   dextrose 50 % injection 50 mL 50 mL Intravenous PRN

## 2019-10-04 NOTE — PROGRESS NOTES
Double RN skin check done prior to transfer off Unit. Skin check performed by this RN and Ion Valdes. Wounds are as follows: surgical puncture wounds    Will remain available for any further questions or concerns.

## 2019-10-05 VITALS
WEIGHT: 155.13 LBS | DIASTOLIC BLOOD PRESSURE: 65 MMHG | HEIGHT: 58.27 IN | SYSTOLIC BLOOD PRESSURE: 141 MMHG | OXYGEN SATURATION: 96 % | BODY MASS INDEX: 32.13 KG/M2 | RESPIRATION RATE: 16 BRPM | HEART RATE: 64 BPM | TEMPERATURE: 98 F

## 2019-10-05 PROCEDURE — 99239 HOSP IP/OBS DSCHRG MGMT >30: CPT | Performed by: HOSPITALIST

## 2019-10-05 RX ORDER — FAMOTIDINE 20 MG/1
20 TABLET ORAL 2 TIMES DAILY
Status: DISCONTINUED | OUTPATIENT
Start: 2019-10-05 | End: 2019-10-05

## 2019-10-05 RX ORDER — AMOXICILLIN AND CLAVULANATE POTASSIUM 875; 125 MG/1; MG/1
1 TABLET, FILM COATED ORAL 2 TIMES DAILY
Qty: 8 TABLET | Refills: 0 | Status: SHIPPED | OUTPATIENT
Start: 2019-10-05 | End: 2019-10-09

## 2019-10-05 RX ORDER — ATORVASTATIN CALCIUM 80 MG/1
80 TABLET, FILM COATED ORAL NIGHTLY
Qty: 30 TABLET | Refills: 0 | Status: SHIPPED | OUTPATIENT
Start: 2019-10-05

## 2019-10-05 RX ORDER — METOPROLOL TARTRATE 50 MG/1
50 TABLET, FILM COATED ORAL
Qty: 60 TABLET | Refills: 0 | Status: SHIPPED | OUTPATIENT
Start: 2019-10-05

## 2019-10-05 NOTE — PLAN OF CARE
Per Dr. Renzo Mares, discharge patient home now . Have pt take first dose of eliquis tonight at home at 9pm. So pt can take medication at 9am / 9pm as home regimen. 30 day eliquis free coupon provided to patient.  SW notified for financial assistance prior to d/c

## 2019-10-05 NOTE — PLAN OF CARE
Problem: Patient Centered Care  Goal: Patient preferences are identified and integrated in the patient's plan of care  Description  Interventions:  - What would you like us to know as we care for you? I live at home with my son.  I have 8 children  - Prov at baseline  Description  INTERVENTIONS:  - Continuous cardiac monitoring, monitor vital signs, obtain 12 lead EKG if indicated  - Evaluate effectiveness of antiarrhythmic and heart rate control medications as ordered  - Initiate emergency measures for lif

## 2019-10-05 NOTE — PROGRESS NOTES
[facility] CLINICS  Progress Note    Tiffani Castro Patient Status:  Inpatient    1950 MRN [id number]   Location [facility] 2W/SW Attending Benito Schrader MD   Hosp Day # 5 PCP None Pcp     Subjective:  No chest pain or shortness of ventricle: The cavity size was normal. Wall thickness was     increased in a pattern of mild LVH. Systolic function was     normal. The estimated ejection fraction was 70%. Wall motion was     normal; there were no regional wall motion abnormalities.      D Intravenous Q6H PRN   dextrose 50 % injection 50 mL 50 mL Intravenous PRN   Glucose-Vitamin C (DEX-4) chewable tab 4 tablet 4 tablet Oral Q15 Min PRN   glucose (DEX4) oral liquid 15 g 15 g Oral Q15 Min PRN       Assessment and Plan:    1.  Afib with RVR  -

## 2019-10-05 NOTE — OCCUPATIONAL THERAPY NOTE
OCCUPATIONAL THERAPY EVALUATION - INPATIENT     Room Number: 325/325-A  Evaluation Date: 10/5/2019  Type of Evaluation: Initial  Presenting Problem: Acute biliary pancreatitis; S/P lap sera.     Physician Order: IP Consult to Occupational Therapy  Reason f Problems:    Acute biliary pancreatitis      Past Medical History  Past Medical History:   Diagnosis Date   • Diabetes [facility])    • Essential hypertension        Past Surgical History  Past Surgical History:   Procedure Laterality Date   • LAPAROSCOPIC REGINALDO Sit : Contact guard assist  Sit to Stand: Supervision  Toilet Transfer: SBA  Chair Transfer: SBA    Bedroom Mobility: Ambulated with walker and SBA    BALANCE ASSESSMENT  Static Sitting: Good  Dynamic Sitting: Good  Static Standing: Good  Dynamic Standing:

## 2019-10-05 NOTE — PHYSICAL THERAPY NOTE
PHYSICAL THERAPY EVALUATION - INPATIENT     Room Number: 325/325-A  Evaluation Date: 10/5/2019  Type of Evaluation: Initial   Physician Order: PT Eval and Treat    Presenting Problem: Hortencia lucas 10/3/19  Reason for Therapy: Mobility Dysfunction and Discha benefit from home with initial assist/support from family. Patient will benefit from continued IP PT services to address these deficits in preparation for discharge. DISCHARGE RECOMMENDATIONS  PT Discharge Recommendations: Home; Intermittent Supervisi limits    Lower extremity ROM is within functional limits   Lower extremity strength is within functional limits    BALANCE  Static Sitting: Normal  Dynamic Sitting: Good  Static Standing: Fair +  Dynamic Standing: Fair    ACTIVITY TOLERANCE  Pulse: 69  He EOB @ level: independent     Goal #1   Current Status    Goal #2 Patient is able to demonstrate transfers Sit to/from Stand at assistance level: modified independent with walker - rolling     Goal #2  Current Status    Goal #3 Patient is able to ambulate 1

## 2019-10-05 NOTE — PROGRESS NOTES
[facility]    Progress Note    Marthaking Lovett Matthew Patient Status:  Inpatient    1950 MRN [id number]   Location [facility] 2W/SW Attending Odalis Snow MD   Hosp Day # 5 PCP None Pcp       Subjective:   Glenn Maya CO2 21.0   < > 22.0 24.0 25.0   ALKPHO 97  --  94 94 93   AST 24  --  19 53* 38*   ALT 61*  --  46 68* 62*   BILT 1.2  --  1.5 0.7 0.9   TP 6.6  --  6.4 6.4 6.3*   ABEL 46  --   --   --   --     < > = values in this interval not displayed.              Nicole Fernandoacy

## 2019-10-05 NOTE — PROGRESS NOTES
[facility] Note: Route Optimization for Famotidine (PEPCID)    Patient is currently on Famotidine (PEPCID) 20 mg IV every 12 hours.    The patient meets the criteria to convert to the oral equivalent as established by the IV to Oral conversion protocol ap

## 2019-10-05 NOTE — DISCHARGE SUMMARY
[facility] - [facility]    Discharge Summary    [facility] Kelch Matthew Patient Status:  Inpatient    1950 MRN [id number]   Location [facility] 3W/SW Attending Justine Marie MD   Hosp Day # 5 PCP None Pcp     Date of Admission: 20 pancreatitis/Choledocholithiasis  -lipase ~6000 on admission  -now improved  -lfts improved  -MRCP with cholelithiasis/acute cholecystitis  -plan was for surgery but given episode of afib will get cardiology clearance first   -npo initially -  CLD now - ad these medications      Instructions Prescription details   Metoprolol Tartrate 50 MG Tabs  Commonly known as:  LOPRESSOR  What changed:    · medication strength  · how much to take  · when to take this      Take 1 tablet (50 mg total) by mouth 2x Daily(Bet

## 2019-10-08 ENCOUNTER — TELEPHONE (OUTPATIENT)
Dept: CARDIOLOGY UNIT | Facility: HOSPITAL | Age: 69
End: 2019-10-08

## 2019-10-15 ENCOUNTER — TELEPHONE (OUTPATIENT)
Dept: INTERNAL MEDICINE UNIT | Facility: HOSPITAL | Age: 69
End: 2019-10-15

## 2019-10-15 NOTE — TELEPHONE ENCOUNTER
Call to hospitalist office from [address] requesting refill on Metformin and Losartan Hydrochlorothiazide. Discussed w Hospitalist APN and Phamacist notified hospitalist team is unable to prescribe refills,.   Pt is to be evaluated by a Primary c

## 2022-05-08 ENCOUNTER — HOSPITAL ENCOUNTER (EMERGENCY)
Facility: HOSPITAL | Age: 72
Discharge: HOME | End: 2022-05-08
Attending: EMERGENCY MEDICINE
Payer: MEDICAID

## 2022-05-08 VITALS
DIASTOLIC BLOOD PRESSURE: 97 MMHG | RESPIRATION RATE: 18 BRPM | HEART RATE: 65 BPM | WEIGHT: 154.31 LBS | HEIGHT: 60.24 IN | OXYGEN SATURATION: 96 % | TEMPERATURE: 98 F | BODY MASS INDEX: 29.9 KG/M2 | SYSTOLIC BLOOD PRESSURE: 198 MMHG

## 2022-05-08 DIAGNOSIS — I10 PRIMARY HYPERTENSION: Primary | ICD-10-CM

## 2022-05-08 DIAGNOSIS — E11.10 DIABETIC KETOACIDOSIS WITHOUT COMA ASSOCIATED WITH TYPE 2 DIABETES MELLITUS (HCC): ICD-10-CM

## 2022-05-08 LAB — GLUCOSE BLDC GLUCOMTR-MCNC: 175 MG/DL (ref 70–99)

## 2022-05-08 PROCEDURE — 99283 EMERGENCY DEPT VISIT LOW MDM: CPT

## 2022-05-08 PROCEDURE — 82962 GLUCOSE BLOOD TEST: CPT

## 2022-05-08 RX ORDER — LOSARTAN POTASSIUM AND HYDROCHLOROTHIAZIDE 12.5; 5 MG/1; MG/1
1 TABLET ORAL DAILY
Qty: 30 TABLET | Refills: 0 | Status: SHIPPED | OUTPATIENT
Start: 2022-05-08

## 2022-05-08 RX ORDER — METOPROLOL TARTRATE 50 MG/1
50 TABLET, FILM COATED ORAL 2 TIMES DAILY
Qty: 60 TABLET | Refills: 0 | Status: SHIPPED | OUTPATIENT
Start: 2022-05-08 | End: 2022-06-07

## 2022-05-08 NOTE — ED QUICK NOTES
Pt was evaluated, assessed and discharge before RN could assess pt. Discharge instructions were given, Pt verbalized understanding.

## 2022-05-08 NOTE — ED INITIAL ASSESSMENT (HPI)
Pt presents to the ER d/t losing her purse that contained all her medication 2 days ago. Pt on diabetic and blood pressure medications.     Requesting refill of medications

## (undated) DEVICE — APPLICATOR COTTON TIP 6\" 2/PK

## (undated) DEVICE — TISSUE RETRIEVAL SYSTEM: Brand: INZII RETRIEVAL SYSTEM

## (undated) DEVICE — LAP CHOLE: Brand: MEDLINE INDUSTRIES, INC.

## (undated) DEVICE — ADHESIVE MASTISOL 2/3CC VL

## (undated) DEVICE — SOL  .9 1000ML BTL

## (undated) DEVICE — SOL  .9 3000ML

## (undated) DEVICE — INSUFFLATION NEEDLE TO ESTABLISH PNEUMOPERITONEUM.: Brand: INSUFFLATION NEEDLE

## (undated) DEVICE — 3M™ TEGADERM™ TRANSPARENT FILM DRESSING, 1626W, 4 IN X 4-3/4 IN (10 CM X 12 CM), 50 EACH/CARTON, 4 CARTON/CASE: Brand: 3M™ TEGADERM™

## (undated) DEVICE — 12 ML SYRINGE LUER-LOCK TIP: Brand: MONOJECT

## (undated) DEVICE — OPEN-END URETERAL CATHETER SOF-FLEX: Brand: SOF-FLEX

## (undated) DEVICE — CULTURE TUBE ANAEROBIC

## (undated) DEVICE — DEVICE PORT SITE CLOSURE

## (undated) DEVICE — [HIGH FLOW INSUFFLATOR,  DO NOT USE IF PACKAGE IS DAMAGED,  KEEP DRY,  KEEP AWAY FROM SUNLIGHT,  PROTECT FROM HEAT AND RADIOACTIVE SOURCES.]: Brand: PNEUMOSURE

## (undated) DEVICE — GAMMEX® PI HYBRID SIZE 7.5, STERILE POWDER-FREE SURGICAL GLOVE, POLYISOPRENE AND NEOPRENE BLEND: Brand: GAMMEX

## (undated) DEVICE — UNDYED BRAIDED (POLYGLACTIN 910), SYNTHETIC ABSORBABLE SUTURE: Brand: COATED VICRYL

## (undated) DEVICE — NEEDLE HPO 18GA 1.5IN ECLPS

## (undated) DEVICE — CULTURE COLLECT/TRANSPORT SYS

## (undated) DEVICE — DISPOSABLE LAPAROSCOPIC CLIP APPLIER WITH 20 CLIPS.: Brand: EPIX® UNIVERSAL CLIP APPLIER

## (undated) DEVICE — SUTURE VICRYL 0 J906G

## (undated) DEVICE — TROCAR: Brand: KII® SLEEVE

## (undated) DEVICE — TROCAR: Brand: KII FIOS FIRST ENTRY

## (undated) NOTE — LETTER
[address] [place] [address]  Autorizacion para Procedimientos Invasivos    1.  Por el presente, autorizo al doctor TRÄSLÖVSLÄGE, a mi(s) médico(s) y a [place] designado dominick asistente del doctor, a realizar la siguiente operación y/o proce hacerme eliana transfusión autóloga de Cristiano, o eliana transfusión de un donante directo, lo discutiré con mi médico.   6. Doy mi consentimiento para que se tomen fotografías del futuro procedimiento(s) con los efectos de ayudar a progresar la medicina, la cien ([address])             Emmie Runner) Orson Peabrie)    ________________________________________________________________________________________________________________________   (Persona responsable del pacientsarika jacome inconsciente) (Relación con

## (undated) NOTE — LETTER
[facility]   Administracion de Krystina Castro, O ___________________________________ pedrito Castellano se                              (Representante)    administre anestesia jann el procedimiento, oper presion, dificultad al orinar, dolor de carter, disminucion del ritmo cardiaco del karlos. Riesgos remotos incluyen: infecciones, alto bloqueo poole, sangramiento del canal poole, convulciones, parocardiaco y Cher[place].   6. CONCIENCIA: Comprendo que es posib (Firma del Testigo)                                                                       ___________________________________________     ___________________________________________________

## (undated) NOTE — LETTER
[address], [place], [place]     AUTHORIZATION FOR SURGICAL OPERATION OR PROCEDURE    I hereby authorize Dr. Latricia Antonio MD, my Physician(s) and whomever may be designated as the doctor's Assistant, to perform the 4. I consent to the photographing of procedure(s) to be performed for the purposes of advancing medicine, science and/or education, provided my identity is not revealed.  If the procedure has been videotaped, the physician/surgeon will obtain the original v (Witness signature)                                                                                                  (Date)                                (Time)  STATEMENT OF PHYSICIAN My signature below affirms that prior to the time of the procedure;  I